# Patient Record
Sex: MALE | Race: WHITE
[De-identification: names, ages, dates, MRNs, and addresses within clinical notes are randomized per-mention and may not be internally consistent; named-entity substitution may affect disease eponyms.]

---

## 2020-08-30 ENCOUNTER — HOSPITAL ENCOUNTER (EMERGENCY)
Dept: HOSPITAL 97 - ER | Age: 70
Discharge: HOME | End: 2020-08-30
Payer: SELF-PAY
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
## 2020-08-30 DIAGNOSIS — S01.81XA: Primary | ICD-10-CM

## 2020-08-30 PROCEDURE — 99283 EMERGENCY DEPT VISIT LOW MDM: CPT

## 2020-08-30 PROCEDURE — 0JQ10ZZ REPAIR FACE SUBCUTANEOUS TISSUE AND FASCIA, OPEN APPROACH: ICD-10-PCS

## 2020-08-30 NOTE — EDPHYS
Physician Documentation                                                                           

 [facility]                                                                 

Name: Diogenes Cast                                                                                

Age: 69 yrs                                                                                       

Sex: Male                                                                                         

: 1950                                                                                   

MRN: [id number]                                                                                   

Arrival Date: 2020                                                                          

Time: 12:18                                                                                       

Account#: [id number]                                                                            

Bed 19                                                                                            

Private MD:                                                                                       

ED Physician Shira Ervin                                                                    

HPI:                                                                                              

                                                                                             

12:55 This 69 yrs old  Male presents to ER via Ambulatory with complaints of Fall    cp  

      Injury.                                                                                     

13:00 Details of fall: The patient fell from an upright position, while walking, while        cp  

      carrying case of [employer]. Onset: The symptoms/episode began/occurred today.          

      Associated injuries: The patient sustained injury to the head, laceration, of the above     

      left eye and above right eye, swelling, tenderness.                                         

13:00 Severity of symptoms: in the emergency department the symptoms are unchanged, No LOC,   cp  

      no nausea/vomiting, no c/o headache. Mild pain to area of lacerations.                      

                                                                                                  

Historical:                                                                                       

- Allergies:                                                                                      

12:36 No Known Allergies;                                                                     ss  

                                                                                                  

- Immunization history:: Adult Immunizations up to date.                                          

- Social history:: Smoking status: Patient denies any tobacco usage or history of.                

                                                                                                  

                                                                                                  

ROS:                                                                                              

13:20 Constitutional: Negative for body aches, chills, fever.                                 cp  

13:20 Eyes: Negative for injury, pain, redness, and discharge.                                cp  

13:20 Neck: Negative for pain with movement, pain at rest.                                        

13:20 Cardiovascular: Negative for chest pain, palpitations.                                      

13:20 Respiratory: Negative for cough, shortness of breath, wheezing.                             

13:20 Abdomen/GI: Negative for abdominal pain, nausea, vomiting, and diarrhea.                    

13:20 Back: Negative for pain at rest, pain with movement.                                        

13:20 Skin: Positive for laceration(s), of the above right eye and above left eye.                

13:20 Neuro: Negative for altered mental status, dizziness, headache, loss of consciousness,      

      syncope, weakness.                                                                          

13:20 All other systems are negative.                                                             

                                                                                                  

Exam:                                                                                             

13:25 Constitutional: The patient appears in no acute distress, alert, awake,                 cp  

      non-diaphoretic, well developed, well nourished.                                            

13:25 Head/face: Noted is a laceration(s), that is linear, of the  above left eye and above   cp  

      right eye, swelling, that is mild, tenderness, that is mild, Sinus tenderness, is not       

      appreciated.                                                                                

13:25 Eyes: Pupils: equal, round, and reactive to light and accomodation, Extraocular             

      movements: intact throughout, Conjunctiva: normal, no exudate, no injection, Lids and       

      lashes: appear normal, bilaterally.                                                         

13:25 ENT: External ear(s): are unremarkable, Nose: is normal, Posterior pharynx: Airway: no      

      evidence of obstruction, patent.                                                            

13:25 Neck: C-spine: vertebral tenderness, is not appreciated, crepitus, is not appreciated,      

      ROM/movement: is normal, is supple, without pain, no range of motions limitations.          

13:25 Chest/axilla: Inspection: normal, Palpation: is normal, no crepitus, no tenderness.         

13:25 Cardiovascular: Rate: normal.                                                               

13:25 Respiratory: the patient does not display signs of respiratory distress,  Respirations:     

      normal, no use of accessory muscles, labored breathing, is not present.                     

13:25 Abdomen/GI: Exam negative for discomfort, distension, guarding, Inspection: abdomen         

      appears normal.                                                                             

13:25 Back: pain, is absent, ROM is normal.                                                       

13:25 Musculoskeletal/extremity: Exam is negative for decreased range of motion, deformity,       

      injury.                                                                                     

13:25 Skin: injury, laceration(s), the wound is approximately  2 cm(s), of the above right        

      eye, the second wound is approximately  3 cm(s), of the above left eye, that can be         

      described as clean, no foreign body, linear, with mild bleeding.                            

13:25 Neuro: Orientation: to person, place \T\ time. Mentation: is normal, Cerebellar function:   

      is grossly normal, Motor: moves all fours, strength is normal, Sensation: is normal,        

      Gait: is steady, at a normal pace, without difficulty.                                      

                                                                                                  

Vital Signs:                                                                                      

12:30  / 75; Pulse 82; Resp 16; Temp 98.3(TE); Pulse Ox 98% on R/A; Weight 99.79 kg;    ss  

      Height 5 ft. 10 in. (177.80 cm); Pain 4/10;                                                 

14:30  / 67; Pulse 78; Resp 18; Pulse Ox 98% on R/A;                                    em  

12:30 Body Mass Index 31.57 (99.79 kg, 177.80 cm)                                             ss  

                                                                                                  

Laceration:                                                                                       

15:20 Wound Repair of 2cm ( 0.8in ) subcutaneous laceration to above right eye. Linear        cp  

      shaped.. Distal neuro/vascular/tendon intact. Anesthesia: Wound infiltrated with 2 mls      

      of 1% lidocaine w/ Epi. Wound prep: Moderate cleansing by me. Skin closed with 3 6-0        

      Prolene using simple sutures and sterile technique. Dressed with Bacitracin. Patient        

      tolerated well.                                                                             

15:20 Wound Repair of 3cm ( 1.2in ) subcutaneous laceration to above left eye. Linear         cp  

      shaped.. Distal neuro/vascular/tendon intact. Anesthesia: Wound infiltrated with 3 mls      

      of 1% lidocaine w/ Epi. Wound prep: Moderate cleansing by me. Skin closed with 6 6-0        

      Prolene using simple sutures and sterile technique. Dressed with Bacitracin. Patient        

      tolerated well.                                                                             

                                                                                                  

MDM:                                                                                              

12:36 Patient medically screened.                                                             cp  

15:19 Data reviewed: vital signs, nurses notes, and as a result, I will discharge patient.    cp  

15:19 Counseling: I had a detailed discussion with the patient and/or guardian regarding: the cp  

      historical points, exam findings, and any diagnostic results supporting the                 

      discharge/admit diagnosis, to return to the emergency department if symptoms worsen or      

      persist or if there are any questions or concerns that arise at home. Response to           

      treatment: the patient's symptoms have markedly improved after treatment, and as a          

      result, I will discharge patient.                                                           

                                                                                                  

                                                                                             

12:48 Order name: Dressing - Wound; Complete Time: 15:17                                      cp  

                                                                                             

12:48 Order name: Gloves, Sterile; Complete Time: 15:26                                       cp  

                                                                                             

12:48 Order name: Setup Suture Tray; Complete Time: 13:06                                     cp  

                                                                                             

15:19 Order name: Wound dressing; Complete Time: 15:26                                        cp  

                                                                                                  

Administered Medications:                                                                         

15:00 Drug: Lidocaine-Epinephrine -1%: (1:100,000) 10 ml {Note: administered by JASON Mancia}   em  

      Volume: 20 ml; Route: Infiltration;                                                         

15:17 Follow up: Response: No adverse reaction; Pain is decreased                             em  

                                                                                                  

                                                                                                  

Disposition:                                                                                      

15:35 Chart complete.                                                                         cp  

                                                                                                  

Disposition:                                                                                      

20 15:19 Discharged to Home. Impression: Laceration without foreign body of unspecified     

  part of head.                                                                                   

- Condition is Stable.                                                                            

- Discharge Instructions: Facial Laceration.                                                      

- Prescriptions for Keflex 500 mg Oral Capsule - take 1 capsule by ORAL route every 8             

  hours for 7 days; 21 capsule.                                                                   

- Medication Reconciliation Form, Thank You Letter, Antibiotic Education, Prescription            

  Opioid Use form.                                                                                

- Follow up: Private Physician; When: 1 week; Reason: Staple/Suture removal.                      

- Problem is new.                                                                                 

- Symptoms have improved.                                                                         

                                                                                                  

                                                                                                  

                                                                                                  

Signatures:                                                                                       

Sigifredo Foster RN                        Amy Holland RN RN   ss                                                   

Gavino Santos PA PA   cp                                                   

                                                                                                  

Corrections: (The following items were deleted from the chart)                                    

15:29 15:19 2020 15:19 Discharged to Home. Impression: Laceration without foreign body  em  

      of unspecified part of head. Condition is Stable. Forms are Medication Reconciliation       

      Form, Thank You Letter, Antibiotic Education, Prescription Opioid Use. Follow up:           

      Private Physician; When: 1 week; Reason: Staple/Suture removal. Problem is new.             

      Symptoms have improved. cp                                                                  

                                                                                                  

**************************************************************************************************

## 2020-08-30 NOTE — ER
Nurse's Notes                                                                                     

 [facility]                                                                 

Name: Diogenes Cast                                                                                

Age: 69 yrs                                                                                       

Sex: Male                                                                                         

: 1950                                                                                   

MRN: [id number]                                                                                   

Arrival Date: 2020                                                                          

Time: 12:18                                                                                       

Account#: [id number]                                                                            

Bed 19                                                                                            

Private MD:                                                                                       

Diagnosis: Laceration without foreign body of unspecified part of head                            

                                                                                                  

Presentation:                                                                                     

                                                                                             

12:30 Chief complaint: Patient states: Mechanical fall from standing at 1129. Abrasion noted  ss  

      to L shin. small laceration noted to bilateral eyebrows from glasses. Denies LOC.           

      Coronavirus screen: Client denies travel out of the [place] in the last 14 days. Ebola         

      Screen: Patient denies exposure to infectious person. Patient denies travel to an           

      Ebola-affected area in the 21 days before illness onset. Initial Sepsis Screen: Does        

      the patient meet any 2 criteria? No. Patient's initial sepsis screen is negative. Does      

      the patient have a suspected source of infection? No. Patient's initial sepsis screen       

      is negative. Risk Assessment: Do you want to hurt yourself or someone else? Patient         

      reports no desire to harm self or others. Onset of symptoms was 2020.            

12:30 Method Of Arrival: Ambulatory                                                           ss  

12:30 Acuity: JS 4                                                                           ss  

                                                                                                  

Historical:                                                                                       

- Allergies:                                                                                      

12:36 No Known Allergies;                                                                     ss  

                                                                                                  

- Immunization history:: Adult Immunizations up to date.                                          

- Social history:: Smoking status: Patient denies any tobacco usage or history of.                

                                                                                                  

                                                                                                  

Screenin:35 Abuse screen: Denies threats or abuse. Nutritional screening: No deficits noted.        em  

      Tuberculosis screening: No symptoms or risk factors identified. Fall Risk Fall in past      

      12 months (25 points). Total Fontaine Fall Scale indicates Low Risk Score (25-44 pts).         

      Side Rails Up X 2.                                                                          

                                                                                                  

Assessment:                                                                                       

12:45 General: Appears in no apparent distress. comfortable, Behavior is calm, cooperative,   em  

      appropriate for age. Pain: Complains of pain in middle aspect of right eyebrow and          

      middle aspect of left eyebrow Pain currently is 4 out of 10 on a pain scale. Neuro:         

      Level of Consciousness is awake, alert, obeys commands, Oriented to person, place,          

      time, situation, Appropriate for age. Cardiovascular: Capillary refill < 3 seconds          

      Patient's skin is warm and dry. Respiratory: Airway is patent Respiratory effort is         

      even, unlabored, Respiratory pattern is regular, symmetrical. Derm: Skin is intact, is      

      healthy with good turgor, Skin is pink, warm \T\ dry. Musculoskeletal: Capillary refill <   

      3 seconds, Range of motion: intact in all extremities. Injury Description: Abrasion         

      sustained to left shin Laceration sustained to middle aspect of right eyebrow and           

      middle aspect of left eyebrow.                                                              

13:45 Reassessment: Patient appears in no apparent distress at this time. Patient and/or      em  

      family updated on plan of care and expected duration. Pain level reassessed. ambulated      

      to the restroom with steady gait.                                                           

14:45 Reassessment: Patient appears in no apparent distress at this time. Patient and/or      em  

      family updated on plan of care and expected duration. Pain level reassessed. Patient is     

      alert, oriented x 3, equal unlabored respirations, skin warm/dry/pink.                      

                                                                                                  

Vital Signs:                                                                                      

12:30  / 75; Pulse 82; Resp 16; Temp 98.3(TE); Pulse Ox 98% on R/A; Weight 99.79 kg;    ss  

      Height 5 ft. 10 in. (177.80 cm); Pain 4/10;                                                 

14:30  / 67; Pulse 78; Resp 18; Pulse Ox 98% on R/A;                                    em  

12:30 Body Mass Index 31.57 (99.79 kg, 177.80 cm)                                               

                                                                                                  

ED Course:                                                                                        

12:18 Patient arrived in ED.                                                                  mr  

12:28 Sigifredo Foster, RN is Primary Nurse.                                                      em  

12:31 Gavino Santos PA is PHCP.                                                                cp  

12:31 Shira Ervin MD is Attending Physician.                                           cp  

12:35 Patient has correct armband on for positive identification.                             em  

12:36 Triage completed.                                                                       ss  

12:36 Arm band placed on right wrist.                                                         ss  

15:15 Assist provider with laceration repair on middle aspect of left eyebrow and middle      em  

      aspect of right eyebrow that was 2.5 cm. or less using sutures. Set up tray. Performed      

      by Gavino GOODEN Dressed with Neosporin, Patient tolerated well.                            

15:28 Patient did not have IV access during this emergency room visit.                        em  

                                                                                                  

Administered Medications:                                                                         

15:00 Drug: Lidocaine-Epinephrine -1%: (1:100,000) 10 ml {Note: administered by GIACOMO Mancia.}   em  

      Volume: 20 ml; Route: Infiltration;                                                         

15:17 Follow up: Response: No adverse reaction; Pain is decreased                             em  

                                                                                                  

                                                                                                  

Outcome:                                                                                          

15:19 Discharge ordered by MD.                                                                rivas  

15:27 Discharged to home ambulatory.                                                          em  

15:27 Condition: good                                                                             

15:27 Discharge instructions given to patient, Instructed on discharge instructions, follow       

      up and referral plans. medication usage, Demonstrated understanding of instructions,        

      follow-up care, medications, Prescriptions given X 1.                                       

15:29 Patient left the ED.                                                                    em  

                                                                                                  

Signatures:                                                                                       

Gloria Myles Edgar, RN                        RN   em                                                   

Amy Salazar RN                      RN   ss                                                   

Gavino Santos, PA                         PA   cp                                                   

                                                                                                  

**************************************************************************************************

## 2020-09-02 VITALS — SYSTOLIC BLOOD PRESSURE: 158 MMHG | DIASTOLIC BLOOD PRESSURE: 67 MMHG

## 2020-09-02 VITALS — OXYGEN SATURATION: 98 % | TEMPERATURE: 98.3 F
